# Patient Record
Sex: MALE | Race: WHITE | Employment: FULL TIME | URBAN - METROPOLITAN AREA
[De-identification: names, ages, dates, MRNs, and addresses within clinical notes are randomized per-mention and may not be internally consistent; named-entity substitution may affect disease eponyms.]

---

## 2022-08-26 ENCOUNTER — HOSPITAL ENCOUNTER (EMERGENCY)
Age: 42
Discharge: HOME | End: 2022-08-26
Attending: EMERGENCY MEDICINE
Payer: COMMERCIAL

## 2022-08-26 ENCOUNTER — APPOINTMENT (OUTPATIENT)
Dept: GENERAL RADIOLOGY | Age: 42
End: 2022-08-26
Payer: COMMERCIAL

## 2022-08-26 VITALS
SYSTOLIC BLOOD PRESSURE: 109 MMHG | HEART RATE: 53 BPM | HEIGHT: 69 IN | RESPIRATION RATE: 20 BRPM | TEMPERATURE: 97.8 F | OXYGEN SATURATION: 98 % | WEIGHT: 174 LBS | DIASTOLIC BLOOD PRESSURE: 86 MMHG | BODY MASS INDEX: 25.77 KG/M2

## 2022-08-26 DIAGNOSIS — R55 VASOVAGAL SYNCOPE: Primary | ICD-10-CM

## 2022-08-26 DIAGNOSIS — R00.1 BRADYCARDIA, SINUS: ICD-10-CM

## 2022-08-26 LAB
ALBUMIN SERPL-MCNC: 3.9 G/DL (ref 3.5–5)
ALBUMIN/GLOB SERPL: 1.1 {RATIO} (ref 1.2–3.5)
ALP SERPL-CCNC: 69 U/L (ref 50–136)
ALT SERPL-CCNC: 27 U/L (ref 12–65)
ANION GAP SERPL CALC-SCNC: 2 MMOL/L (ref 7–16)
AST SERPL-CCNC: 12 U/L (ref 15–37)
BASOPHILS # BLD: 0.1 K/UL (ref 0–0.2)
BASOPHILS NFR BLD: 1 % (ref 0–2)
BILIRUB SERPL-MCNC: 0.9 MG/DL (ref 0.2–1.1)
BUN SERPL-MCNC: 14 MG/DL (ref 6–23)
CALCIUM SERPL-MCNC: 9.3 MG/DL (ref 8.3–10.4)
CHLORIDE SERPL-SCNC: 107 MMOL/L (ref 98–107)
CO2 SERPL-SCNC: 31 MMOL/L (ref 21–32)
CREAT SERPL-MCNC: 1.3 MG/DL (ref 0.8–1.5)
DIFFERENTIAL METHOD BLD: ABNORMAL
EOSINOPHIL # BLD: 0.1 K/UL (ref 0–0.8)
EOSINOPHIL NFR BLD: 1 % (ref 0.5–7.8)
ERYTHROCYTE [DISTWIDTH] IN BLOOD BY AUTOMATED COUNT: 11.3 % (ref 11.9–14.6)
GLOBULIN SER CALC-MCNC: 3.6 G/DL (ref 2.3–3.5)
GLUCOSE SERPL-MCNC: 107 MG/DL (ref 65–100)
HCT VFR BLD AUTO: 46.2 % (ref 41.1–50.3)
HGB BLD-MCNC: 15.9 G/DL (ref 13.6–17.2)
IMM GRANULOCYTES # BLD AUTO: 0 K/UL (ref 0–0.5)
IMM GRANULOCYTES NFR BLD AUTO: 0 % (ref 0–5)
LYMPHOCYTES # BLD: 1.8 K/UL (ref 0.5–4.6)
LYMPHOCYTES NFR BLD: 34 % (ref 13–44)
MCH RBC QN AUTO: 32.4 PG (ref 26.1–32.9)
MCHC RBC AUTO-ENTMCNC: 34.4 G/DL (ref 31.4–35)
MCV RBC AUTO: 94.3 FL (ref 79.6–97.8)
MONOCYTES # BLD: 0.5 K/UL (ref 0.1–1.3)
MONOCYTES NFR BLD: 9 % (ref 4–12)
NEUTS SEG # BLD: 2.8 K/UL (ref 1.7–8.2)
NEUTS SEG NFR BLD: 55 % (ref 43–78)
NRBC # BLD: 0 K/UL (ref 0–0.2)
PLATELET # BLD AUTO: 215 K/UL (ref 150–450)
PMV BLD AUTO: 10.1 FL (ref 9.4–12.3)
POTASSIUM SERPL-SCNC: 3.9 MMOL/L (ref 3.5–5.1)
PROT SERPL-MCNC: 7.5 G/DL (ref 6.3–8.2)
RBC # BLD AUTO: 4.9 M/UL (ref 4.23–5.6)
SODIUM SERPL-SCNC: 140 MMOL/L (ref 138–145)
TROPONIN I SERPL HS-MCNC: 3.8 PG/ML (ref 0–14)
TROPONIN I SERPL HS-MCNC: 5 PG/ML (ref 0–14)
WBC # BLD AUTO: 5.2 K/UL (ref 4.3–11.1)

## 2022-08-26 PROCEDURE — 80053 COMPREHEN METABOLIC PANEL: CPT

## 2022-08-26 PROCEDURE — 84484 ASSAY OF TROPONIN QUANT: CPT

## 2022-08-26 PROCEDURE — 71045 X-RAY EXAM CHEST 1 VIEW: CPT

## 2022-08-26 PROCEDURE — 99285 EMERGENCY DEPT VISIT HI MDM: CPT

## 2022-08-26 PROCEDURE — 85025 COMPLETE CBC W/AUTO DIFF WBC: CPT

## 2022-08-26 ASSESSMENT — ENCOUNTER SYMPTOMS
BACK PAIN: 0
NAUSEA: 0
ABDOMINAL PAIN: 0
DIARRHEA: 0
VOMITING: 0
COUGH: 0
SHORTNESS OF BREATH: 0
SORE THROAT: 0

## 2022-08-26 ASSESSMENT — PAIN - FUNCTIONAL ASSESSMENT: PAIN_FUNCTIONAL_ASSESSMENT: 0-10

## 2022-08-26 ASSESSMENT — PAIN SCALES - GENERAL: PAINLEVEL_OUTOF10: 0

## 2022-08-26 NOTE — ED TRIAGE NOTES
Pt arrives from an inpatient floor. Pt had a syncopal episode this AM. Pt states he felt lightheaded prior to syncope. Denies lightheadedness at this time. Denies CP. Denies SOB.

## 2022-08-26 NOTE — ED PROVIDER NOTES
file.     No family history on file. Social History     Socioeconomic History    Marital status: Single        Allergies: Patient has no known allergies. There are no discharge medications for this patient. Vitals signs and nursing note reviewed. Patient Vitals for the past 4 hrs:   Pulse Resp BP SpO2   08/26/22 1530 53 20 109/86 98 %   08/26/22 1415 (!) 45 16 111/74 99 %   08/26/22 1345 (!) 44 12 105/80 99 %   08/26/22 1245 (!) 48 17 107/77 100 %          Physical Exam  Vitals and nursing note reviewed. Constitutional:       Appearance: Normal appearance. HENT:      Head: Normocephalic and atraumatic. Cardiovascular:      Rate and Rhythm: Regular rhythm. Bradycardia present. Pulses: Normal pulses. Heart sounds: Normal heart sounds. Pulmonary:      Effort: Pulmonary effort is normal.      Breath sounds: Normal breath sounds. Abdominal:      General: Abdomen is flat. Bowel sounds are normal.      Palpations: Abdomen is soft. Tenderness: There is no abdominal tenderness. Musculoskeletal:         General: No tenderness. Normal range of motion. Cervical back: Normal range of motion and neck supple. Skin:     General: Skin is warm and dry. Neurological:      General: No focal deficit present. Mental Status: He is alert and oriented to person, place, and time.    Psychiatric:         Behavior: Behavior normal.        Orders Placed This Encounter   Procedures    XR CHEST PORTABLE    CBC with Auto Differential    Comprehensive Metabolic Panel    Troponin    Troponin    Cardiac Monitor - ED Only    Continuous Pulse Oximetry    Orthostatic blood pressure and pulse    EKG 12 Lead       Procedures    ED EKG Interpretation  EKG was interpreted in the absence of a cardiologist.    Rate: Rate: Bradycardia  EKG Interpretation: Sinus bradycardia  ST Segments: Normal ST segments - NO STEMI    Labs Reviewed   CBC WITH AUTO DIFFERENTIAL - Abnormal; Notable for the following components:       Result Value    RDW 11.3 (*)     All other components within normal limits   COMPREHENSIVE METABOLIC PANEL - Abnormal; Notable for the following components:    Anion Gap 2 (*)     Glucose 107 (*)     AST 12 (*)     Globulin 3.6 (*)     Albumin/Globulin Ratio 1.1 (*)     All other components within normal limits   TROPONIN   TROPONIN        XR CHEST PORTABLE   Final Result                         ED Course as of 08/26/22 1611   Fri Aug 26, 2022   1148 Patient is resting comfortably in bed with no complaints. His heart rate is in the high 40s to 50s range. His blood pressure is 105 over 60s. Nursing has not performed orthostatics yet. [CW]   8904 Patient is resting comfortably in bed without any complaints. His heart rate has been fluctuating between the high 40s to mid 50s. Patient's orthostatics were normal. [CW]   0044 Patient was resting comfortably in bed without any symptoms. His heart rate was 45. I stood him up and he states that he felt fine while standing. His heart rate increased to 58-60 on the monitor. I explained the results and plan and patient is comfortable with discharge. [CW]      ED Course User Index  [CW] Elijah Liang MD       MDM  Number of Diagnoses or Management Options  Vasovagal syncope  Diagnosis management comments: Patient presents for evaluation of syncope which appears to be vasovagal in nature. Patient has a history of similar syncopal episodes. Patient's EKG does show that he is in sinus bradycardia. His heart rate would fluctuate from the mid 40s up to the 60s. Patient does workout on a regular basis and states his heart rate normally runs in the mid 50s range. Patient's heart rate improved from 45 up to 60 when he started so and is compensating. His orthostatic vital signs were normal.  Patient's serial troponins were normal and ACS was ruled out. Patient's other labs showed no significant abnormalities.   I do not suspect coronary disease, PE, pericardial disease, or any other acute cardiopulmonary process. There is no indication of sepsis or stroke. Patient was able to ambulate around the emergency department and felt well. I do not feel the patient needs admission to the hospital.  Patient was discharged home with cardiology follow-up. Amount and/or Complexity of Data Reviewed  Clinical lab tests: reviewed  Tests in the medicine section of CPT®: reviewed        Explanation: The results of any testing and treatments (if done) today in the emergency department were communicated to the patient and/or their family/caregiver (if present). The patient/caregiver verbalized understanding and compliance with the treatment plan and reasons to return immediately to the emergency department. All questions were answered at bedside      ICD-10-CM    1. Vasovagal syncope  R55       2. Bradycardia, sinus  R00.1           DISPOSITION Decision To Discharge 08/26/2022 03:28:24 PM       Voice dictation software was used during the making of this note. This software is not perfect and grammatical and other typographical errors may be present. This note has not been completely proofread for errors.      Estrellita Denson MD  08/26/22 0484

## 2022-09-27 ENCOUNTER — INITIAL CONSULT (OUTPATIENT)
Dept: CARDIOLOGY CLINIC | Age: 42
End: 2022-09-27
Payer: COMMERCIAL

## 2022-09-27 VITALS
DIASTOLIC BLOOD PRESSURE: 88 MMHG | HEIGHT: 69 IN | BODY MASS INDEX: 26.81 KG/M2 | WEIGHT: 181 LBS | HEART RATE: 56 BPM | SYSTOLIC BLOOD PRESSURE: 110 MMHG

## 2022-09-27 DIAGNOSIS — R55 VASOVAGAL SYNCOPE: ICD-10-CM

## 2022-09-27 PROCEDURE — 99204 OFFICE O/P NEW MOD 45 MIN: CPT | Performed by: INTERNAL MEDICINE

## 2022-09-27 ASSESSMENT — PATIENT HEALTH QUESTIONNAIRE - PHQ9
2. FEELING DOWN, DEPRESSED OR HOPELESS: 0
SUM OF ALL RESPONSES TO PHQ9 QUESTIONS 1 & 2: 0
SUM OF ALL RESPONSES TO PHQ QUESTIONS 1-9: 0
1. LITTLE INTEREST OR PLEASURE IN DOING THINGS: 0
SUM OF ALL RESPONSES TO PHQ QUESTIONS 1-9: 0

## 2022-09-27 NOTE — PROGRESS NOTES
+[facility]  [address], [address] [facility], [address] [place] [address]  PHONE: [phone]           HISTORY AND PHYSICAL          SUBJECTIVE:   [place]  is a 36 y.o. male seen for a consultation visit regarding the following:     Chief Complaint   Patient presents with    Consultation            HPI:    One episode syncope. Hr and bp dipped while visiting in icu. Worse with postural change. No post episode confusion. did not eat or drink much that day. No recurrence. No Known Allergies  No past medical history on file. no dm, htn or dyslipidemia  No past surgical history on file. Family History   Problem Relation Age of Onset    Heart Surgery Father      Social History     Tobacco Use    Smoking status: Former     Types: Cigarettes    Smokeless tobacco: Former   Substance Use Topics    Alcohol use: Not on file       No current outpatient medications on file. ROS:    Constitution: Negative for fever. Eyes: Negative for blurred vision. Respiratory: Negative for cough. Endocrine: Negative for cold intolerance and heat intolerance. Skin: Negative for rash. Musculoskeletal: Negative for myalgias. Gastrointestinal: Negative for diarrhea, nausea and vomiting. Genitourinary: Negative for dysuria. Neurological: Negative for headaches and numbness. PHYSICAL EXAM:     /88   Pulse 56   Ht 5' 9\" (1.753 m)   Wt 181 lb (82.1 kg)   BMI 26.73 kg/m²    Constitutional: Oriented to person, place, and time. Appears well-developed and well-nourished. Head: Normocephalic and atraumatic. Neck: Neck supple. Cardiovascular: Normal rate and regular rhythm with no murmur -No JVP  Pulmonary/Chest: Breath sounds normal.   Abdominal: Soft. Musculoskeletal: No edema. Neurological: Alert and oriented to person, place, and time. Skin: Skin is warm and dry. Psychiatric: Normal mood and affect.    Vitals reviewed    Wt Readings from Last 3 Encounters:   09/27/22 181 lb (82.1 kg)   08/26/22 174 lb (78.9 kg)        Medical problems and test results were reviewed with the patient today. No results found for any visits on 09/27/22. No results found for this or any previous visit (from the past 672 hour(s)). No results found for: CHOL, CHOLPOCT, CHOLX, CHLST, CHOLV, HDL, HDLPOC, HDLC, LDL, LDLC, VLDLC, VLDL, TGLX, TRIGL    Outside records reviewed by me and summarized- ekg my interpretation sb 52 normal st segments    ASSESSMENT and PLAN  1. Vasovagal syncope  One episode- hydration and regular meals discussed- further eval if symptoms recur         Return if symptoms worsen or fail to improve. Thank you for allowing me to participate in this patient's care. Please call or contact me if there are any questions or concerns regarding the above.       Alysia Jo MD  09/27/22  9:38 AM